# Patient Record
Sex: FEMALE | Race: WHITE | NOT HISPANIC OR LATINO | Employment: UNEMPLOYED | URBAN - METROPOLITAN AREA
[De-identification: names, ages, dates, MRNs, and addresses within clinical notes are randomized per-mention and may not be internally consistent; named-entity substitution may affect disease eponyms.]

---
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
## 2025-01-07 ENCOUNTER — TELEPHONE (OUTPATIENT)
Dept: PSYCHIATRY | Facility: CLINIC | Age: 20
End: 2025-01-07
Payer: COMMERCIAL

## 2025-01-07 DIAGNOSIS — F90.0 ATTENTION DEFICIT HYPERACTIVITY DISORDER (ADHD), PREDOMINANTLY INATTENTIVE TYPE: ICD-10-CM

## 2025-01-07 RX ORDER — ATOMOXETINE 60 MG/1
60 CAPSULE ORAL DAILY
Qty: 90 CAPSULE | Refills: 1 | Status: SHIPPED | OUTPATIENT
Start: 2025-01-07

## 2025-01-07 NOTE — TELEPHONE ENCOUNTER
EDY received faxed 90 day supply request for atomoxetine (STRATTERA) 60 MG capsule          Jazzmine Velez CMA January 7, 2025

## 2025-01-13 ENCOUNTER — TELEPHONE (OUTPATIENT)
Dept: PSYCHIATRY | Facility: CLINIC | Age: 20
End: 2025-01-13
Payer: COMMERCIAL

## 2025-01-13 NOTE — TELEPHONE ENCOUNTER
EDY received faxed 90 day supply request for hydrOXYzine HCl (ATARAX) 10 MG tablet          Jazzmine Velez CMA January 13, 2025

## 2025-01-16 ENCOUNTER — TELEPHONE (OUTPATIENT)
Dept: PSYCHIATRY | Facility: CLINIC | Age: 20
End: 2025-01-16
Payer: COMMERCIAL

## 2025-01-16 NOTE — TELEPHONE ENCOUNTER
EDY received faxed 90 day supply request for hydrOXYzine HCl (ATARAX) 10 MG tablet      Patient does have a follow-up appointment scheduled on 01/23/2025.     Routing to RN to aubriey refmckenzie Velez CMA January 16, 2025

## 2025-01-23 PROBLEM — K35.30 ACUTE APPENDICITIS WITH LOCALIZED PERITONITIS, UNSPECIFIED WHETHER ABSCESS PRESENT, UNSPECIFIED WHETHER GANGRENE PRESENT, UNSPECIFIED WHETHER PERFORATION PRESENT: Status: RESOLVED | Noted: 2023-06-08 | Resolved: 2025-01-23

## 2025-02-06 ENCOUNTER — ANCILLARY PROCEDURE (OUTPATIENT)
Dept: ULTRASOUND IMAGING | Facility: CLINIC | Age: 20
End: 2025-02-06
Attending: FAMILY MEDICINE
Payer: COMMERCIAL

## 2025-02-06 DIAGNOSIS — Z97.5 BREAKTHROUGH BLEEDING WITH IUD: ICD-10-CM

## 2025-02-06 DIAGNOSIS — Z97.5 IUD (INTRAUTERINE DEVICE) IN PLACE: ICD-10-CM

## 2025-02-06 DIAGNOSIS — N92.1 BREAKTHROUGH BLEEDING WITH IUD: ICD-10-CM

## 2025-02-06 PROCEDURE — 76856 US EXAM PELVIC COMPLETE: CPT | Performed by: RADIOLOGY

## 2025-02-06 PROCEDURE — 76830 TRANSVAGINAL US NON-OB: CPT | Performed by: RADIOLOGY

## 2025-02-20 ENCOUNTER — VIRTUAL VISIT (OUTPATIENT)
Dept: PSYCHIATRY | Facility: CLINIC | Age: 20
End: 2025-02-20
Payer: COMMERCIAL

## 2025-02-20 DIAGNOSIS — F90.0 ATTENTION DEFICIT HYPERACTIVITY DISORDER (ADHD), PREDOMINANTLY INATTENTIVE TYPE: ICD-10-CM

## 2025-02-20 DIAGNOSIS — F33.1 MODERATE EPISODE OF RECURRENT MAJOR DEPRESSIVE DISORDER (H): ICD-10-CM

## 2025-02-20 DIAGNOSIS — F41.1 GAD (GENERALIZED ANXIETY DISORDER): ICD-10-CM

## 2025-02-20 RX ORDER — VILAZODONE HYDROCHLORIDE 40 MG/1
40 TABLET ORAL
Qty: 30 TABLET | Refills: 1 | Status: SHIPPED | OUTPATIENT
Start: 2025-02-20

## 2025-02-20 ASSESSMENT — PAIN SCALES - GENERAL: PAINLEVEL_OUTOF10: NO PAIN (0)

## 2025-02-20 NOTE — PATIENT INSTRUCTIONS
Medication Plan  -Continue atomoxetine 60mg daily for ADHD  -Increase Viibryd to 40mg daily   -Continue hydroxyzine 10-20mg once daily as needed for anxiety or sleep    **For crisis resources, please see the information at the end of this document**   Patient Education    Thank you for coming to the [facility] [place] [facility].     Lab Testing:  If you had lab testing today and your results are reassuring or normal they will be mailed to you or sent through [employer] within 7 days. If the lab tests need quick action we will call you with the results. The phone number we will call with results is # [phone]. If this is not the best number please call our clinic and change the number.     Medication Refills:  If you need any refills please call your pharmacy and they will contact us. Our fax number for refills is [phone].   Three business days of notice are needed for general medication refill requests.   Five business days of notice are needed for controlled substance refill requests.   If you need to change to a different pharmacy, please contact the new pharmacy directly. The new pharmacy will help you get your medications transferred.     Contact Us:  Please call [phone] during business hours (8-5:00 M-F).   If you have medication related questions after clinic hours, or on the weekend, please call [phone].     Financial Assistance [phone]   Medical Records [phone]       MENTAL HEALTH CRISIS RESOURCES:  For a emergency help, please call 911 or go to the nearest Emergency Department.     Emergency Walk-In Options:   EmPATH Unit @ [facility] ([place]): [phone] - Specialized mental health emergency area designed to be calming  [facility] ([place]): [phone]  [facility] Psychiat[facility] ([place]): [phone]  [facility] ([place]): [phone]    [facility] Crisis Information:   [place]:  [phone]  [place]: [phone]  [place] (COPE) - Adult: [phone]     Child: [phone]  [place] - Adult: [phone]     Child: [phone]  [place]: [phone]  List of all [facility] resources:   https://[place].gov/dhs/people-we-serve/adults/health-care/mental-health/resources/crisis-contacts.jsp    National Crisis Information:   Crisis Text Line: Text  [place]  to 416881  Suicide & Crisis Lifeline: 988  National Suicide Prevention Lifeline: 5-280-934-TALK (1-824.342.6466)       For online chat options, visit https://suicidepreventionlifeline.org/chat/  Poison Control Center: 9-229-103-4725  Trans Lifeline: 1-955.444.1562 - Hotline for transgender people of all ages  [employer]: 8-826-340-7369 - Hotline for LGBT youth     For Non-Emergency Support:   Fast Tracker: Mental Health & Substance Use Disorder Resources -   https://www.[employer].org/

## 2025-02-20 NOTE — PROGRESS NOTES
[facility]  MEDICAL PROGRESS NOTE     Jenae Callaway is a 20 year old adult who prefers the name Lena and pronouns she/her.     CARE TEAM:   PCP- Jeanie West  Therapist- Lacey Gutierrez at [facility]              Assessment & Plan   History and interview support the following diagnoses:   Generalized anxiety disorder  Major depressive disorder, recurrent, moderate  ADHD, predominantly inattentive  Trichotillomania      Lena is a 20-year-old adult with psychiatric history of anxiety, depression, ADHD, and trichotillomania who presents for psychiatric follow-up.      Lena was last seen 12/23/24 at which time no changes were made. Today, anxiety has been stable however Lena has been experiencing  low energy, low motivation, and anhedonia. She attributes this in part to the season/weather. To target depressive symptoms will increase Viibryd to 40mg daily and adjust as needed over the next several months with upcoming seasonal change. We did discuss risks/benefits/side effects of dose increase, including a review of serotonin excess and symptoms to monitor for. She knows to contact clinic between visits should any concerns arise. Denies all safety concerns.     Evaluation from 12/28/23 initial assessment:  Lena Callaway is a very pleasant 19yo adult who presents for a psychiatric evaluation at the [facility]. Patient carries previous diagnoses of MDD, NANNETTE, ADHD, and trichotillomania. She previously received care at [facility] and was most recently seen by Dr. Tisha Grant on 05/23/23 at which time sertraline 200mg daily and Concerta 36mg daily were continued. She transitioned out of [facility] due to turning 18 earlier this year. Since that visit Lena has struggled to establish with an adult psychiatric provider. 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 She currently resides in [place] and is only home in [place] for a short period of time over the holidays. She plans to return to [place] in early January 2023 and will move back to the [place] in June 2024. Given that I am unable to prescribe medications or treat patient while she is overseas we decided to complete the evaluation today but will defer medications until patient returns from [place]. I recommended patient attempt to establish care with a psychiatric provider in [place] in the meantime. Both patient and her mother are in agreement with this plan and will contact clinic in June to schedule a follow-up when patient returns home. There are no acute safety concerns. No problematic substance use. Patient is not currently prescribed psychotropic medications.     Medication discussion:  Since stopping sertraline and Concerta patient has noticed minimal impact on mood or anxiety. 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 She has had several previous stimulant trials noting that ritalin IR seemed to be the most effective and best tolerated. Concerta and ritalin LA led to tachycardia and hyperfocus. We had a thorough discussion of non-stimulant options for management of ADHD - including bupropion, atomoxetine, or clonidine/guanfacine. The patient expressed interest in future trial of a non-stimulant medication for management of ADHD. Strattera may be a good option as it has been shown to be beneficial for ADHD as well as anxiety. We also reviewed benefits of antidepressant medication for management of mood/anxiety. She has past trials of escitalopram and sertraline. She felt that escitalopram led to more mood lability but sertraline seemed to be beneficial and well-tolerated. Could consider use of sertraline again in the future or fluoxetine which could be a good option for mood/anxiety and  trichotillomania. We will plan to continue our medication discussion on follow-up in June.     PSYCHOTROPIC DRUG INTERACTIONS: **Italicized interactions are for treatment plan options not currently implemented**  N/A    MANAGEMENT:  N/A    [place]: Not taking controlled medications.              Plan    1) PSYCHOTROPIC MEDICATION RECOMMENDATIONS:  -Continue atomoxetine 60mg daily for ADHD  -Increase Viibryd to 40mg daily   -Continue hydroxyzine 10-20mg once daily as needed for anxiety or sleep    Taking OTC supplement Berberine. DDI completed - nothing clinically significant noted with Strattera + Viibryd.    2) THERAPY: None    3) NEXT DUE:   Labs- None - LFTs completed 07/10/24 prior to initiation of atomoxetine (WNL)  ECG- N/A   Rating Scales- N/A    4) REFERRALS / COORDINATION: PGx testing    5) RTC: 04/10 at 4p    Treatment Risk Statement:  The patient understands the risks, benefits, adverse effects and alternatives. Agrees to treatment with the capacity to do so. No medical contraindications to treatment. Agrees to contact provider for any problems. The patient understands to call 911 or go to the nearest ED if urgent or life threatening symptoms occur. Crisis contact numbers are provided routinely in the After Visit Summary.     Lena did not appear to be an imminent safety risk to self or others.    PROVIDER:  YANICK Arora CNP              Pertinent Background  Jenae notes history of anxiety, depression, ADHD, and trichotillomania. She was followed previously by a child fellow (Dr. Grant) at [facility] however transitioned out of that clinic in May 2023 due to turning 18-years-old. Since then Lena has struggled to establish consistent care with an adult psychiatric provider. She had one previous appointment with a provider through [employer] who has since left the organization. Stopped sertraline and Concerta summer 2023. Not currently taking psychotropic medication.   No history  "of self-harm, suicide attempts, hypo/leonard, psychosis, or psychiatric hospitalizations. Hx of passive SI lauro and senior year of high school; never with plan or intent. She completed PHP in spring 2022.   Psych pertinent item history includes suicidal ideation              Interim History     Lena was last seen 12/23/24 at which time no changes were made. Since the last visit:  -This semester has been going well. Coursework is harder this semester but finding content more interesting.   -Anxiety has been stable. Mood is \"fine\" but feeling very unmotivated, slow, difficulty getting out of bed, anhedonia.   -No safety concerns - SI/NSSI/HI.     Medication ASE: Denies  Medication adherence: Reports good adherence    Recent Psych Symptoms:   Depression: Improved mood, self-esteem  Elevated:  none  Psychosis:  none  Anxiety:  excessive worry; worry usually pertains to the future and feeling like she isn't doing enough.  Trauma Related:  none  Insomnia: Does have trouble getting herself to actually getting to bed. Some nights can take ~20 min to fall asleep.  Other:  Yes: hx of trichotillomania, improving. Continues to engage in daily hair pulling from scalp or eye-brows usually at the end of the day when their are less distractions. Describes this as more like \"playing\" with her hair. Bald spots have grown in. Symptoms less severe than in the past.     Pertinent Negatives: No suicidal or violent ideation, psychosis, or hypo/leonard.      Pertinent Social Hx:  FINANCIAL SUPPORT-  Graduated college in spring 2023. 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 Taking courses at [employer].  LIVING SITUATION / RELATIONSHIPS- Living with parents, younger brother.  SOCIAL/ SPIRITUAL SUPPORT- Family, friends    Pertinent Substance Use  Alcohol- None  Nicotine- None  Caffeine- None   Opioids- None  Narcan Kit- N/A  THC/CBD- None  Other Illicit Drugs- none              Medical Review " of Systems   Dizziness/orthostasis- Denies  Headaches- Denies  GI- Denies  Denies history of renal, hepatic, or cardiac concerns. Was seen by cardiology in July 2023 due to history of dyslipidemia. Working on lifestyle strategies for lowering cholesterol.   A comprehensive review of systems was performed and is negative other than noted above.                                                                                                                                       Psych Summary Points  12/2023: Initial consultation; no medication changes as patient will be returning to live in [place] through June 2024.  07/2024: Started Strattera 40mg daily for ADHD symptoms, added hydroxyzine 10-20mg every day PRN for breakthrough anxiety symptoms. LFTs completed 07/10/24 prior to initiation of Strattera - LFTs = WNL.  08/2024: Started Zoloft 50mg daily    10/2024: Self-discontinued Zoloft due to ASE, Viibryd 10mg daily started for depressive symptoms.  11/2024: Increased Strattera to 60mg daily for ADHD management. Increased Viibryd to 20mg daily.  02/2025: Increased Viibryd 40mg daily for mood support               Past Psychotropic Medications     Medication Max Dose (mg) Dates / Duration Helpful? DC Reason / Adverse Effects?   Ritalin IR 5mg BID PRN   Worked the best out of all stimulants. Helped with focus, thinking, organization of thoughts. Pretty well tolerated. Took with Ritalin LA 20mg every day.   sertraline 200mg Spring 2022-Fall 2023  Helpful for mood, but no noticeable difference when stopped. Denies ASE from med. Re-trial fall 2024 - led to insomnia, heightened anxiety and was subsequently discontinued.    Ritalin LA 20mg   Tachycardia    Concerta 36mg 2023  Physically felt anxious, tachycardia, hyperfocus   Lexapro 20mg [id number]  Mood lability, took prior to sertraline   Propranolol 10mg every day PRN 2022                   Past Medical History     Medication allergies:    Allergies   Allergen Reactions     Animal Dander Dermatitis and Itching       Neurologic Hx [head injury, seizures, etc.]: None  Patient Active Problem List   Diagnosis    Generalized anxiety disorder    Trichotillomania    NANNETTE (generalized anxiety disorder)    Menorrhagia with regular cycle    Attention deficit hyperactivity disorder (ADHD), predominantly inattentive type    Mixed hyperlipidemia    IUD (intrauterine device) in place     Past Medical History:   Diagnosis Date    Acute appendicitis with localized peritonitis, unspecified whether abscess present, unspecified whether gangrene present, unspecified whether perforation present 06/08/2023       Contraception- IUD in place, not sexually active               Medications   Current Outpatient Medications   Medication Sig Dispense Refill    atomoxetine (STRATTERA) 60 MG capsule Take 1 capsule (60 mg) by mouth daily. 90 capsule 1    hydrOXYzine HCl (ATARAX) 10 MG tablet Take 1-2 tablets (10-20 mg) by mouth daily as needed for anxiety. 30 tablet 0    levonorgestrel (MIRENA) 52 MG (20 mcg/day) IUD by Intrauterine route once      vilazodone (VIIBRYD) 40 MG TABS tablet Take 1 tablet (40 mg) by mouth daily with food. 30 tablet 1                 Physical Exam  (Vitals Only)  LMP 12/29/2024 (Approximate)     Pulse Readings from Last 5 Encounters:   01/23/25 90   11/25/24 77   08/19/24 105   07/09/24 92   07/08/24 93     Wt Readings from Last 5 Encounters:   01/23/25 83.9 kg (185 lb)   11/25/24 81.6 kg (180 lb) (94%, Z= 1.59)*   08/19/24 81.9 kg (180 lb 9.6 oz) (95%, Z= 1.61)*   07/09/24 79.8 kg (176 lb) (94%, Z= 1.53)*   07/08/24 80.4 kg (177 lb 3.2 oz) (94%, Z= 1.55)*     * Growth percentiles are based on CDC (Girls, 2-20 Years) data.     BP Readings from Last 5 Encounters:   01/23/25 104/74   11/25/24 110/62   08/19/24 113/80   07/09/24 106/74   07/08/24 105/75                 Mental Status Exam  Alertness: alert  and oriented  Appearance: adequately groomed  Behavior/Demeanor: cooperative,  pleasant, and calm, with good eye contact   Speech: normal and regular rate and rhythm  Language: intact and no problems  Psychomotor: fidgety  Mood: depressed  Affect: full range and appropriate; was congruent to mood  Thought Process/Associations: unremarkable  Thought Content:  Reports none;  Denies suicidal ideation, violent ideation, and delusions  Perception:  Reports none;  Denies auditory hallucinations and visual hallucinations  Insight: intact  Judgment: intact  Cognition: does  appear grossly intact; formal cognitive testing was not done  oriented: time, person, and place  Gait and Station:  [facility]                Data         8/19/2024    12:18 PM 10/10/2024     1:38 PM 1/23/2025     3:58 PM   PHQ   PHQ-9 Total Score 6 4 7    Q9: Thoughts of better off dead/self-harm past 2 weeks Not at all Not at all Not at all       Patient-reported         10/10/2024     1:39 PM 12/23/2024    12:05 PM 1/23/2025     3:59 PM   NANNETTE-7 SCORE   Total Score 3 (minimal anxiety) 10 (moderate anxiety) 5 (mild anxiety)   Total Score 3 10  5        Patient-reported       Liver/kidney function Metabolic Blood counts   Recent Labs   Lab Test 07/10/24  1043 06/07/23  2240   CR 0.67 0.69   AST 23 22   ALT 20 14   ALKPHOS 102 102*    Recent Labs   Lab Test 01/31/25  0913 07/10/24  1043 01/09/24  1124   CHOL 222*   < > 298*   TRIG 162*   < > 119*   *   < > 221*   HDL 42*   < > 53   TSH  --   --  3.23    < > = values in this interval not displayed.    Recent Labs   Lab Test 07/10/24  1043   WBC 7.6   HGB 13.1   HCT 39.1   MCV 91             Administrative Billing:     Level of Medical Decision Making:   - At least 1 chronic problem that is not stable  - Engaged in prescription drug management during visit (discussed any medication benefits, side effects, alternatives, etc.)    The longitudinal plan of care for the diagnosis(es)/condition(s) of ADHD, major depressive disorder, and generalized anxiety disorder as  documented were addressed during this visit. Due to the added complexity in care, I will continue to support Lena in the subsequent management and with ongoing continuity of care.

## 2025-02-20 NOTE — NURSING NOTE
Current patient location: [address] [place] [address]    Is the patient currently in the state of [place]? YES    Visit mode: VIDEO    If the visit is dropped, the patient can be reconnected by:VIDEO VISIT: Text to cell phone:   Telephone Information:   Mobile [phone]   Mobile [phone]   Mobile [phone]       Will anyone else be joining the visit? NO  (If patient encounters technical issues they should call [phone]    Are changes needed to the allergy or medication list? No    Are refills needed on medications prescribed by this physician? NO    Rooming Documentation:  Patient will complete questionnaire(s) in [facility]    Reason for visit: RECHECK    Keke COONF

## 2025-03-20 ENCOUNTER — OFFICE VISIT (OUTPATIENT)
Dept: URGENT CARE | Facility: URGENT CARE | Age: 20
End: 2025-03-20
Payer: COMMERCIAL

## 2025-03-20 VITALS
WEIGHT: 180 LBS | SYSTOLIC BLOOD PRESSURE: 110 MMHG | BODY MASS INDEX: 28.93 KG/M2 | TEMPERATURE: 97.3 F | HEIGHT: 66 IN | DIASTOLIC BLOOD PRESSURE: 68 MMHG | RESPIRATION RATE: 18 BRPM | OXYGEN SATURATION: 97 % | HEART RATE: 78 BPM

## 2025-03-20 DIAGNOSIS — J06.9 VIRAL URI: Primary | ICD-10-CM

## 2025-03-20 NOTE — PROGRESS NOTES
"Chief Complaint   Patient presents with    Urgent Care     Not feeling well, x 7 days. Coughing, mucus, congestion, sinus congestion, trouble hearing.        Assessment & Plan  Assessment  -Viral URI.  Reassuring exam and vitals.    Plan  - Use Mucinex or Mucinex DM for symptom relief.  - Use a teaspoon of honey to help with the cough.  - Use ibuprofen or Tylenol for additional symptom relief.  - Rest and avoid going out or partying over the weekend.  - Follow-up if symptoms persist after a week for further evaluation.     ICD-10-CM    1. Viral URI  J06.9           SUBJECTIVE:  History of Present Illness-Lena Callaway, a 20-year-old female, reports experiencing symptoms consistent with a cold for the past week. She notes that her condition has worsened since Sunday. Her symptoms include a cough, which is sometimes dry and sometimes productive with mucus, nasal congestion, muffled hearing, and sinus pressure that she describes as painful. She denies having a fever. A few days ago, she felt as though her head was completely swollen, but this sensation has slightly improved.     ROS: Pertinent ROS neg other than the symptoms noted above in the HPI.     OBJECTIVE:  /68   Pulse 78   Temp 97.3  F (36.3  C) (Temporal)   Resp 18   Ht 1.676 m (5' 6\")   Wt 81.6 kg (180 lb)   LMP 12/29/2024 (Approximate)   SpO2 97%   BMI 29.05 kg/m     Physical Exam  - HEENT: Congested nares noted.  TMs within normal limits, no oropharynx erythema  - LUNGS: Breath sounds clear bilaterally.   GENERAL: alert and no distress  CV: regular rate and rhythm, normal S1 S2, no S3 or S4, no murmur, click or rub, no peripheral edema     DIAGNOSTICS       No results found for any visits on 03/20/25.     Grant Van PA-C    Consent was obtained from the patient to use an AI documentation tool in the creation of this note.  Any grammatical or spelling errors are non-intentional. Please contact the author of this note directly if you are " in need of any clarification.     Current Outpatient Medications   Medication Sig Dispense Refill    atomoxetine (STRATTERA) 60 MG capsule Take 1 capsule (60 mg) by mouth daily. 90 capsule 1    hydrOXYzine HCl (ATARAX) 10 MG tablet Take 1-2 tablets (10-20 mg) by mouth daily as needed for anxiety. 30 tablet 0    levonorgestrel (MIRENA) 52 MG (20 mcg/day) IUD by Intrauterine route once      vilazodone (VIIBRYD) 40 MG TABS tablet Take 1 tablet (40 mg) by mouth daily with food. 30 tablet 1     No current facility-administered medications for this visit.      Patient Active Problem List   Diagnosis    Generalized anxiety disorder    Trichotillomania    NANNETTE (generalized anxiety disorder)    Menorrhagia with regular cycle    Attention deficit hyperactivity disorder (ADHD), predominantly inattentive type    Mixed hyperlipidemia    IUD (intrauterine device) in place      Past Medical History:   Diagnosis Date    Acute appendicitis with localized peritonitis, unspecified whether abscess present, unspecified whether gangrene present, unspecified whether perforation present 06/08/2023     Past Surgical History:   Procedure Laterality Date    LAPAROSCOPIC APPENDECTOMY N/A 6/8/2023    Procedure: Laparoscopic appendectomy;  Surgeon: Jarod South MD;  Location:  OR     Family History   Problem Relation Age of Onset    Substance Abuse Maternal Grandfather     Substance Abuse Maternal Aunt     Glaucoma No family hx of     Hypertension No family hx of     Diabetes No family hx of     Macular Degeneration No family hx of     Breast Cancer No family hx of     Colon Cancer No family hx of     Ovarian Cancer No family hx of      Social History     Tobacco Use    Smoking status: Never     Passive exposure: Never    Smokeless tobacco: Never   Substance Use Topics    Alcohol use: Not Currently

## 2025-04-10 ENCOUNTER — VIRTUAL VISIT (OUTPATIENT)
Dept: PSYCHIATRY | Facility: CLINIC | Age: 20
End: 2025-04-10
Payer: COMMERCIAL

## 2025-04-10 DIAGNOSIS — F33.1 MODERATE EPISODE OF RECURRENT MAJOR DEPRESSIVE DISORDER (H): ICD-10-CM

## 2025-04-10 DIAGNOSIS — F41.1 GAD (GENERALIZED ANXIETY DISORDER): ICD-10-CM

## 2025-04-10 DIAGNOSIS — F90.0 ATTENTION DEFICIT HYPERACTIVITY DISORDER (ADHD), PREDOMINANTLY INATTENTIVE TYPE: ICD-10-CM

## 2025-04-10 RX ORDER — VILAZODONE HYDROCHLORIDE 40 MG/1
40 TABLET ORAL
Qty: 90 TABLET | Refills: 0 | Status: SHIPPED | OUTPATIENT
Start: 2025-04-10

## 2025-04-10 ASSESSMENT — PAIN SCALES - GENERAL: PAINLEVEL_OUTOF10: NO PAIN (0)

## 2025-04-10 NOTE — PROGRESS NOTES
[facility]  MEDICAL PROGRESS NOTE     Jenae Callaway is a 20 year old adult who prefers the name Lena and pronouns she/her.     CARE TEAM:   PCP- Jeanie West  Therapist- Lacey Gutierrez at [facility]              Assessment & Plan   History and interview support the following diagnoses:   Generalized anxiety disorder  Major depressive disorder, recurrent, moderate  ADHD, predominantly inattentive  Trichotillomania      Lena is a 20-year-old adult with psychiatric history of anxiety, depression, ADHD, and trichotillomania who presents for psychiatric follow-up.      Lena was last seen 02/20/25 at which time Viibryd was increased to 40mg daily. Today, Lena reports improvement in motivation and more stable mood since increasing Viibryd. She continues to do well this semester with school and has maintained a part-time job. She also feels that the warmer weather is contributing to improvements. She denies ASE from medications and we will make no changes today.  No safety concerns.     Evaluation from 12/28/23 initial assessment:  Lena Callaway is a very pleasant 19yo adult who presents for a psychiatric evaluation at the [facility]. Patient carries previous diagnoses of MDD, NANNETTE, ADHD, and trichotillomania. She previously received care at [facility] and was most recently seen by Dr. Tisha Grant on 05/23/23 at which time sertraline 200mg daily and Concerta 36mg daily were continued. She transitioned out of [facility] due to turning 18 earlier this year. Since that visit Lena has struggled to establish with an adult psychiatric provider. 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 She currently resides in [place] and is only home in [place] for a short period of time over the holidays. She plans to return to [place] in early January 2023 and will  move back to the [place] in June 2024. Given that I am unable to prescribe medications or treat patient while she is overseas we decided to complete the evaluation today but will defer medications until patient returns from [place]. I recommended patient attempt to establish care with a psychiatric provider in [place] in the meantime. Both patient and her mother are in agreement with this plan and will contact clinic in June to schedule a follow-up when patient returns home. There are no acute safety concerns. No problematic substance use. Patient is not currently prescribed psychotropic medications.     Medication discussion:  Since stopping sertraline and Concerta patient has noticed minimal impact on mood or anxiety. 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 She has had several previous stimulant trials noting that ritalin IR seemed to be the most effective and best tolerated. Concerta and ritalin LA led to tachycardia and hyperfocus. We had a thorough discussion of non-stimulant options for management of ADHD - including bupropion, atomoxetine, or clonidine/guanfacine. The patient expressed interest in future trial of a non-stimulant medication for management of ADHD. Strattera may be a good option as it has been shown to be beneficial for ADHD as well as anxiety. We also reviewed benefits of antidepressant medication for management of mood/anxiety. She has past trials of escitalopram and sertraline. She felt that escitalopram led to more mood lability but sertraline seemed to be beneficial and well-tolerated. Could consider use of sertraline again in the future or fluoxetine which could be a good option for mood/anxiety and trichotillomania. We will plan to continue our medication discussion on follow-up in June.     PSYCHOTROPIC DRUG INTERACTIONS: **Italicized interactions are for treatment plan options not  currently implemented**  N/A    MANAGEMENT:  N/A    [place] was not [place] today: Not taking controlled medications.              Plan    1) PSYCHOTROPIC MEDICATION RECOMMENDATIONS:  -Continue atomoxetine 60mg daily for ADHD  -Continue Viibryd to 40mg daily   -Continue hydroxyzine 10-20mg once daily as needed for anxiety or sleep    Taking OTC supplement Berberine. DDI completed - nothing clinically significant noted with Strattera + Viibryd.    2) THERAPY: None    3) NEXT DUE:   Labs- None - LFTs completed 07/10/24 prior to initiation of atomoxetine (WNL)  ECG- N/A   Rating Scales- N/A    4) REFERRALS / COORDINATION: Consider PGx testing    5) RTC: May 22nd at 1pm    Treatment Risk Statement:  The patient understands the risks, benefits, adverse effects and alternatives. Agrees to treatment with the capacity to do so. No medical contraindications to treatment. Agrees to contact provider for any problems. The patient understands to call 911 or go to the nearest ED if urgent or life threatening symptoms occur. Crisis contact numbers are provided routinely in the After Visit Summary.     Lena did not appear to be an imminent safety risk to self or others.    PROVIDER:  YANICK Arora CNP              Pertinent Background  Jenae notes history of anxiety, depression, ADHD, and trichotillomania. She was followed previously by a child fellow (Dr. Grant) at [facility] however transitioned out of that clinic in May 2023 due to turning 18-years-old. Since then Lena has struggled to establish consistent care with an adult psychiatric provider. She had one previous appointment with a provider through [facility] who has since left the organization. Stopped sertraline and Concerta summer 2023. Not currently taking psychotropic medication.   No history of self-harm, suicide attempts, hypo/leonard, psychosis, or psychiatric hospitalizations. Hx of passive SI lauro and senior year of high school; never with plan or intent. She  "completed PHP in spring 2022.   Psych pertinent item history includes suicidal ideation              Interim History     Lena was last seen 02/20/25 at which time Viibryd was increased to 40mg daily. Since the last visit:  -Things have been \"pretty good.\" Spending more time outside as the weather has been warming up.  -This semester has been going well- As and Bs on her classes. Wants to go into medicine.   -Things at home are going okay; occasional arguments with her parents but no recent \"  -Continues to struggle somewhat with getting herself to sleep at a consistent time. Sleep schedule can impact punctuality.   -She reports good benefit from the higher dose of Viibryd. Denies ASE.     Medication ASE: Denies  Medication adherence: Reports good adherence    Recent Psych Symptoms:   Depression: Motivation and mood are more consistent.   Elevated:  none  Psychosis:  none  Anxiety:  excessive worry; worry usually pertains to the future and feeling like she isn't doing enough.  Trauma Related:  none  Insomnia: Does have trouble getting herself to actually getting to bed. Some nights can take ~20 min to fall asleep.  Other:  Yes: hx of trichotillomania, improving. Occasionally will hair pull when she is stressed or tired.     Pertinent Negatives: No suicidal or violent ideation, psychosis, or hypo/leonard.      Pertinent Social Hx:  FINANCIAL SUPPORT-  Working as a ; about 20 hours per week. Taking courses at [employer].  LIVING SITUATION / RELATIONSHIPS- Living with parents, younger brother.  SOCIAL/ SPIRITUAL SUPPORT- Family, friends    Pertinent Substance Use  Alcohol- None  Nicotine- None  Caffeine- None   Opioids- None  Narcan Kit- N/A  THC/CBD- None  Other Illicit Drugs- none              Medical Review of Systems   Dizziness/orthostasis- Denies  Headaches- Denies  GI- Denies  Denies history of renal, hepatic, or cardiac concerns. Was seen by cardiology in July 2023 due to history of " dyslipidemia. Working on lifestyle strategies for lowering cholesterol.   A comprehensive review of systems was performed and is negative other than noted above.                                                                                                                                       Psych Summary Points  12/2023: Initial consultation; no medication changes as patient will be returning to live in [place] through June 2024.  07/2024: Started Strattera 40mg daily for ADHD symptoms, added hydroxyzine 10-20mg every day PRN for breakthrough anxiety symptoms. LFTs completed 07/10/24 prior to initiation of Strattera - LFTs = WNL.  08/2024: Started Zoloft 50mg daily    10/2024: Self-discontinued Zoloft due to ASE, Viibryd 10mg daily started for depressive symptoms.  11/2024: Increased Strattera to 60mg daily for ADHD management. Increased Viibryd to 20mg daily.  02/2025: Increased Viibryd 40mg daily for mood support               Past Psychotropic Medications     Medication Max Dose (mg) Dates / Duration Helpful? DC Reason / Adverse Effects?   Ritalin IR 5mg BID PRN   Worked the best out of all stimulants. Helped with focus, thinking, organization of thoughts. Pretty well tolerated. Took with Ritalin LA 20mg every day.   sertraline 200mg Spring 2022-Fall 2023  Helpful for mood, but no noticeable difference when stopped. Denies ASE from med. Re-trial fall 2024 - led to insomnia, heightened anxiety and was subsequently discontinued.    Ritalin LA 20mg   Tachycardia    Concerta 36mg 2023  Physically felt anxious, tachycardia, hyperfocus   Lexapro 20mg [id number]  Mood lability, took prior to sertraline   Propranolol 10mg every day PRN 2022                   Past Medical History     Medication allergies:    Allergies   Allergen Reactions    Animal Dander Dermatitis and Itching       Neurologic Hx [head injury, seizures, etc.]: None  Patient Active Problem List   Diagnosis    Generalized anxiety disorder     Trichotillomania    NANNETTE (generalized anxiety disorder)    Menorrhagia with regular cycle    Attention deficit hyperactivity disorder (ADHD), predominantly inattentive type    Mixed hyperlipidemia    IUD (intrauterine device) in place     Past Medical History:   Diagnosis Date    Acute appendicitis with localized peritonitis, unspecified whether abscess present, unspecified whether gangrene present, unspecified whether perforation present 06/08/2023       Contraception- IUD in place, not sexually active               Medications   Current Outpatient Medications   Medication Sig Dispense Refill    atomoxetine (STRATTERA) 60 MG capsule Take 1 capsule (60 mg) by mouth daily. 90 capsule 1    hydrOXYzine HCl (ATARAX) 10 MG tablet Take 1-2 tablets (10-20 mg) by mouth daily as needed for anxiety. 30 tablet 0    levonorgestrel (MIRENA) 52 MG (20 mcg/day) IUD by Intrauterine route once      vilazodone (VIIBRYD) 40 MG TABS tablet Take 1 tablet (40 mg) by mouth daily with food. 30 tablet 1                 Physical Exam  (Vitals Only)  There were no vitals taken for this visit.    Pulse Readings from Last 5 Encounters:   03/20/25 78   01/23/25 90   11/25/24 77   08/19/24 105   07/09/24 92     Wt Readings from Last 5 Encounters:   03/20/25 81.6 kg (180 lb)   01/23/25 83.9 kg (185 lb)   11/25/24 81.6 kg (180 lb) (94%, Z= 1.59)*   08/19/24 81.9 kg (180 lb 9.6 oz) (95%, Z= 1.61)*   07/09/24 79.8 kg (176 lb) (94%, Z= 1.53)*     * Growth percentiles are based on CDC (Girls, 2-20 Years) data.     BP Readings from Last 5 Encounters:   03/20/25 110/68   01/23/25 104/74   11/25/24 110/62   08/19/24 113/80   07/09/24 106/74                 Mental Status Exam  Alertness: alert  and oriented  Appearance: adequately groomed  Behavior/Demeanor: cooperative, pleasant, and calm, with good eye contact   Speech: normal and regular rate and rhythm  Language: intact and no problems  Psychomotor: fidgety  Mood: depressed  Affect: full range and  appropriate; was congruent to mood  Thought Process/Associations: unremarkable  Thought Content:  Reports none;  Denies suicidal ideation, violent ideation, and delusions  Perception:  Reports none;  Denies auditory hallucinations and visual hallucinations  Insight: intact  Judgment: intact  Cognition: does  appear grossly intact; formal cognitive testing was not done  oriented: time, person, and place  Gait and Station:  N/A (telehealth)                Data         8/19/2024    12:18 PM 10/10/2024     1:38 PM 1/23/2025     3:58 PM   PHQ   PHQ-9 Total Score 6 4 7    Q9: Thoughts of better off dead/self-harm past 2 weeks Not at all Not at all Not at all       Patient-reported         10/10/2024     1:39 PM 12/23/2024    12:05 PM 1/23/2025     3:59 PM   NANNETTE-7 SCORE   Total Score 3 (minimal anxiety) 10 (moderate anxiety) 5 (mild anxiety)   Total Score 3 10  5        Patient-reported       Liver/kidney function Metabolic Blood counts   Recent Labs   Lab Test 07/10/24  1043 06/07/23  2240   CR 0.67 0.69   AST 23 22   ALT 20 14   ALKPHOS 102 102*    Recent Labs   Lab Test 01/31/25  0913 07/10/24  1043 01/09/24  1124   CHOL 222*   < > 298*   TRIG 162*   < > 119*   *   < > 221*   HDL 42*   < > 53   TSH  --   --  3.23    < > = values in this interval not displayed.    Recent Labs   Lab Test 07/10/24  1043   WBC 7.6   HGB 13.1   HCT 39.1   MCV 91             Administrative Billing:     Level of Medical Decision Making:   - At least 1 chronic problem that is not stable  - Engaged in prescription drug management during visit (discussed any medication benefits, side effects, alternatives, etc.)    The longitudinal plan of care for the diagnosis(es)/condition(s) as documented were addressed during this visit. Due to the added complexity in care, I will continue to support Lena in the subsequent management and with ongoing continuity of care.

## 2025-04-10 NOTE — PROGRESS NOTES
Virtual Visit Details    Type of service:  Video Visit     Originating Location (pt. Location): Parked car    Distant Location (provider location):  Off-site  Platform used for Video Visit: Kristina

## 2025-04-10 NOTE — NURSING NOTE
Is the patient currently in the state of [place]? YES    Current patient location:  Parked in car outside of work in [place].    Visit mode:Video    If the visit is dropped, the patient can be reconnected by: VIDEO VISIT: Text to cell phone:   Telephone Information:   Mobile [phone]       Will anyone else be joining the visit? No  (If patient encounters technical issues they should call [phone])    Are changes needed to the allergy or medication list? Pt stated no changes to allergies and Pt stated no med changes    Are refills needed on medications prescribed by this physician? Yes    Rooming Documentation: Questionnaire(s) completed.    Reason for visit: RECHECK     Dottie Cornelius, JAYMIEF

## 2025-04-10 NOTE — PATIENT INSTRUCTIONS
Plan:  -Continue atomoxetine 60mg daily for ADHD  -Continue Viibryd to 40mg daily   -Continue hydroxyzine 10-20mg once daily as needed for anxiety or sleep    **For crisis resources, please see the information at the end of this document**   Patient Education    Thank you for coming to the [facility] [place] [facility].     Lab Testing:  If you had lab testing today and your results are reassuring or normal they will be mailed to you or sent through Basha within 7 days. If the lab tests need quick action we will call you with the results. The phone number we will call with results is # [phone]. If this is not the best number please call our clinic and change the number.     Medication Refills:  If you need any refills please call your pharmacy and they will contact us. Our fax number for refills is [phone].   Three business days of notice are needed for general medication refill requests.   Five business days of notice are needed for controlled substance refill requests.   If you need to change to a different pharmacy, please contact the new pharmacy directly. The new pharmacy will help you get your medications transferred.     Contact Us:  Please call [phone] during business hours (8-5:00 M-F).   If you have medication related questions after clinic hours, or on the weekend, please call [phone].     Financial Assistance [phone]   Medical Records [phone]       MENTAL HEALTH CRISIS RESOURCES:  For a emergency help, please call 911 or go to the nearest Emergency Department.     Emergency Walk-In Options:   EmPATH Unit @ [facility] ([place]): [phone] - Specialized mental health emergency area designed to be calming  [facility] ([place]): [phone]  [facility] ([place]): [phone]  [facility] ([place]): [phone]    [facility] Crisis Information:   [place]: [phone]  [place]:  [phone]  [place] ([place]) - Adult: [phone]     Child: [phone]  [place] - Adult: [phone]     Child: [phone]  [place]: [phone]  List of all [facility] resources:   https://[place].gov/dhs/people-we-serve/adults/health-care/mental-health/resources/crisis-contacts.jsp    National Crisis Information:   Crisis Text Line: Text  [place]  to 981865  Suicide & Crisis Lifeline: 988  National Suicide Prevention Lifeline: 4-412-359-TALK (1-739.407.6600)       For online chat options, visit https://suicidepreventionlifeline.org/chat/  Poison Control Center: 1-188.525.6329  Trans Lifeline: 1-592.625.2160 - Hotline for transgender people of all ages  [employer]: 8-217-198-8953 - Hotline for LGBT youth     For Non-Emergency Support:   Fast Tracker: Mental Health & Substance Use Disorder Resources -   https://www.[employer].org/

## 2025-05-07 ENCOUNTER — OFFICE VISIT (OUTPATIENT)
Dept: URGENT CARE | Facility: URGENT CARE | Age: 20
End: 2025-05-07
Payer: COMMERCIAL

## 2025-05-07 VITALS
SYSTOLIC BLOOD PRESSURE: 118 MMHG | RESPIRATION RATE: 16 BRPM | DIASTOLIC BLOOD PRESSURE: 80 MMHG | WEIGHT: 180 LBS | HEIGHT: 66 IN | HEART RATE: 108 BPM | BODY MASS INDEX: 28.93 KG/M2 | TEMPERATURE: 97.4 F | OXYGEN SATURATION: 98 %

## 2025-05-07 DIAGNOSIS — R07.0 THROAT PAIN: Primary | ICD-10-CM

## 2025-05-07 DIAGNOSIS — R53.83 OTHER FATIGUE: ICD-10-CM

## 2025-05-07 DIAGNOSIS — Z20.828 MONO EXPOSURE: ICD-10-CM

## 2025-05-07 LAB
BASOPHILS # BLD AUTO: 0 10E3/UL (ref 0–0.2)
BASOPHILS NFR BLD AUTO: 0 %
DEPRECATED S PYO AG THROAT QL EIA: NEGATIVE
EOSINOPHIL # BLD AUTO: 0.2 10E3/UL (ref 0–0.7)
EOSINOPHIL NFR BLD AUTO: 2 %
ERYTHROCYTE [DISTWIDTH] IN BLOOD BY AUTOMATED COUNT: 11.8 % (ref 10–15)
HCT VFR BLD AUTO: 38.6 % (ref 35–47)
HGB BLD-MCNC: 13 G/DL (ref 11.7–15.7)
IMM GRANULOCYTES # BLD: 0 10E3/UL
IMM GRANULOCYTES NFR BLD: 0 %
LYMPHOCYTES # BLD AUTO: 2.4 10E3/UL (ref 0.8–5.3)
LYMPHOCYTES NFR BLD AUTO: 27 %
MCH RBC QN AUTO: 30 PG (ref 26.5–33)
MCHC RBC AUTO-ENTMCNC: 33.7 G/DL (ref 31.5–36.5)
MCV RBC AUTO: 89 FL (ref 78–100)
MONOCYTES # BLD AUTO: 0.7 10E3/UL (ref 0–1.3)
MONOCYTES NFR BLD AUTO: 8 %
MONOCYTES NFR BLD AUTO: NEGATIVE %
NEUTROPHILS # BLD AUTO: 5.6 10E3/UL (ref 1.6–8.3)
NEUTROPHILS NFR BLD AUTO: 63 %
PLATELET # BLD AUTO: 380 10E3/UL (ref 150–450)
RBC # BLD AUTO: 4.33 10E6/UL (ref 3.8–5.2)
S PYO DNA THROAT QL NAA+PROBE: NOT DETECTED
WBC # BLD AUTO: 8.9 10E3/UL (ref 4–11)

## 2025-05-07 PROCEDURE — 3079F DIAST BP 80-89 MM HG: CPT | Performed by: PHYSICIAN ASSISTANT

## 2025-05-07 PROCEDURE — 87651 STREP A DNA AMP PROBE: CPT | Performed by: PHYSICIAN ASSISTANT

## 2025-05-07 PROCEDURE — 87635 SARS-COV-2 COVID-19 AMP PRB: CPT | Performed by: PHYSICIAN ASSISTANT

## 2025-05-07 PROCEDURE — 86308 HETEROPHILE ANTIBODY SCREEN: CPT | Performed by: PHYSICIAN ASSISTANT

## 2025-05-07 PROCEDURE — 85025 COMPLETE CBC W/AUTO DIFF WBC: CPT | Performed by: PHYSICIAN ASSISTANT

## 2025-05-07 PROCEDURE — 36415 COLL VENOUS BLD VENIPUNCTURE: CPT | Performed by: PHYSICIAN ASSISTANT

## 2025-05-07 PROCEDURE — 99213 OFFICE O/P EST LOW 20 MIN: CPT | Performed by: PHYSICIAN ASSISTANT

## 2025-05-07 PROCEDURE — 3074F SYST BP LT 130 MM HG: CPT | Performed by: PHYSICIAN ASSISTANT

## 2025-05-07 NOTE — PROGRESS NOTES
Urgent Care Clinic Visit    Chief Complaint   Patient presents with    Urgent Care     Brother diagnosed with Mono. Feeling sick with flu like symptoms               5/7/2025     2:18 PM   Additional Questions   Roomed by MARGARET Rosales

## 2025-05-07 NOTE — PROGRESS NOTES
"Assessment & Plan     Throat pain    You had a strep test done today that was neg.  We will perform a strep culture and if any positive results come back we will call you and call in antibiotics.  At this time, this appears to be a viral infection and requires symptomatic treatment.  Most viral sore throats will resolve with in a few days.  If your throat pain worsens then please be  rechecked in the  or by your PCP.    Strep neg, culture pending  Strep neg  Mono neg  CBC normal  - COVID-19 Virus (Coronavirus) by PCR Nose  - Streptococcus A Rapid Screen w/Reflex to PCR  - Mononucleosis screen  - CBC with platelets and differential  - Mononucleosis screen  - CBC with platelets and differential  - Group A Streptococcus PCR Throat Swab    Other fatigue    Likely due to viral infection  Rest, fluids  Covid pending      Mono exposure    Patient has mono exposure but neg on exam and labs       No follow-ups on file.    Tomas Diaz, [facility], PA-C  [facility] [place]    Trent Paredes is a 20 year old female who presents to clinic today for the following health issues:  Chief Complaint   Patient presents with    Urgent Care     Brother diagnosed with Mono. Feeling sick with flu like symptoms         5/7/2025     2:18 PM   Additional Questions   Roomed by MARGARET Rosales     HPI  Review of Systems  Constitutional, HEENT, cardiovascular, pulmonary, gi and gu systems are negative, except as otherwise noted.      Objective    /80   Pulse 108   Temp 97.4  F (36.3  C) (Oral)   Resp 16   Ht 1.676 m (5' 6\")   Wt 81.6 kg (180 lb)   SpO2 98%   BMI 29.05 kg/m    Physical Exam   GENERAL: alert and no distress  EYES: Eyes grossly normal to inspection, PERRL and conjunctivae and sclerae normal  HENT: ear canals and TM's normal, nose and mouth without ulcers or lesions  NECK: no adenopathy, no asymmetry, masses, or scars  RESP: lungs clear to auscultation - no rales, rhonchi or wheezes  CV: regular " rate and rhythm, normal S1 S2, no S3 or S4, no murmur, click or rub, no peripheral edema  MS: no gross musculoskeletal defects noted, no edema  SKIN: no suspicious lesions or rashes  NEURO: Normal strength and tone, mentation intact and speech normal  PSYCH: mentation appears normal, affect normal/bright      Results for orders placed or performed in visit on 05/07/25   Mononucleosis screen     Status: Normal   Result Value Ref Range    Mononucleosis Screen Negative Negative   CBC with platelets and differential     Status: None   Result Value Ref Range    WBC Count 8.9 4.0 - 11.0 10e3/uL    RBC Count 4.33 3.80 - 5.20 10e6/uL    Hemoglobin 13.0 11.7 - 15.7 g/dL    Hematocrit 38.6 35.0 - 47.0 %    MCV 89 78 - 100 fL    MCH 30.0 26.5 - 33.0 pg    MCHC 33.7 31.5 - 36.5 g/dL    RDW 11.8 10.0 - 15.0 %    Platelet Count 380 150 - 450 10e3/uL    % Neutrophils 63 %    % Lymphocytes 27 %    % Monocytes 8 %    % Eosinophils 2 %    % Basophils 0 %    % Immature Granulocytes 0 %    Absolute Neutrophils 5.6 1.6 - 8.3 10e3/uL    Absolute Lymphocytes 2.4 0.8 - 5.3 10e3/uL    Absolute Monocytes 0.7 0.0 - 1.3 10e3/uL    Absolute Eosinophils 0.2 0.0 - 0.7 10e3/uL    Absolute Basophils 0.0 0.0 - 0.2 10e3/uL    Absolute Immature Granulocytes 0.0 <=0.4 10e3/uL   Streptococcus A Rapid Screen w/Reflex to PCR     Status: Normal    Specimen: Throat; Swab   Result Value Ref Range    Group A Strep antigen Negative Negative   CBC with platelets and differential     Status: None    Narrative    The following orders were created for panel order CBC with platelets and differential.  Procedure                               Abnormality         Status                     ---------                               -----------         ------                     CBC with platelets and ...[[id number]]                      Final result                 Please view results for these tests on the individual orders.

## 2025-05-08 LAB — SARS-COV-2 RNA RESP QL NAA+PROBE: NEGATIVE

## 2025-05-22 ENCOUNTER — VIRTUAL VISIT (OUTPATIENT)
Dept: PSYCHIATRY | Facility: CLINIC | Age: 20
End: 2025-05-22
Payer: COMMERCIAL

## 2025-05-22 DIAGNOSIS — F33.1 MODERATE EPISODE OF RECURRENT MAJOR DEPRESSIVE DISORDER (H): ICD-10-CM

## 2025-05-22 DIAGNOSIS — F41.1 GAD (GENERALIZED ANXIETY DISORDER): ICD-10-CM

## 2025-05-22 DIAGNOSIS — F90.0 ATTENTION DEFICIT HYPERACTIVITY DISORDER (ADHD), PREDOMINANTLY INATTENTIVE TYPE: ICD-10-CM

## 2025-05-22 RX ORDER — ATOMOXETINE 60 MG/1
60 CAPSULE ORAL DAILY
Qty: 90 CAPSULE | Refills: 0 | Status: SHIPPED | OUTPATIENT
Start: 2025-05-22

## 2025-05-22 RX ORDER — VILAZODONE HYDROCHLORIDE 40 MG/1
40 TABLET ORAL
Qty: 90 TABLET | Refills: 0 | Status: SHIPPED | OUTPATIENT
Start: 2025-05-22

## 2025-05-22 ASSESSMENT — PAIN SCALES - GENERAL: PAINLEVEL_OUTOF10: NO PAIN (0)

## 2025-05-22 NOTE — PROGRESS NOTES
[facility]  MEDICAL PROGRESS NOTE     Jenae Callaway is a 20 year old adult who prefers the name Lena and pronouns she/her.     CARE TEAM:   PCP- Jeanie West  Therapist- Lacey Gutierrez at [facility]              Assessment & Plan   History and interview support the following diagnoses:   Generalized anxiety disorder  Major depressive disorder, recurrent, moderate  ADHD, predominantly inattentive  Trichotillomania      Lena is a 20-year-old adult with psychiatric history of anxiety, depression, ADHD, and trichotillomania who presents for psychiatric follow-up.      Lena was last seen 04/10/25 at which time no changes were made. Today, Lena reports stable mood and good control of anxiety. She just finished her spring semester and is looking forward to the summer. She reports good benefit from her medications and denies side effects. She does report having had experienced a sensory disturbance in her brain several times; this usually occurs in the setting of medication non-adherence and sounds likely to be consistent with discontinuation symptoms. We talked about the importance of medication adherence and I asked Lena to track these symptoms over the next month so that we can determine if they are occurring when she misses her medications or takes them at variable times. Lena is agreeable with this plan.     Evaluation from 12/28/23 initial assessment:  Lena Callaway is a very pleasant 19yo adult who presents for a psychiatric evaluation at the [facility]. Patient carries previous diagnoses of MDD, NANNETTE, ADHD, and trichotillomania. She previously received care at [facility] and was most recently seen by Dr. Tisha Grant on 05/23/23 at which time sertraline 200mg daily and Concerta 36mg daily were continued. She transitioned out of [facility] due to turning 18 earlier this year. Since that visit Lena has struggled to establish with an  adult psychiatric provider. 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 She currently resides in [place] and is only home in [place] for a short period of time over the holidays. She plans to return to [place] in early January 2023 and will move back to the [place] in June 2024. Given that I am unable to prescribe medications or treat patient while she is overseas we decided to complete the evaluation today but will defer medications until patient returns from [place]. I recommended patient attempt to establish care with a psychiatric provider in [place] in the meantime. Both patient and her mother are in agreement with this plan and will contact clinic in June to schedule a follow-up when patient returns home. There are no acute safety concerns. No problematic substance use. Patient is not currently prescribed psychotropic medications.     Medication discussion:  Since stopping sertraline and Concerta patient has noticed minimal impact on mood or anxiety. 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 She has had several previous stimulant trials noting that ritalin IR seemed to be the most effective and best tolerated. Concerta and ritalin LA led to tachycardia and hyperfocus. We had a thorough discussion of non-stimulant options for management of ADHD - including bupropion, atomoxetine, or clonidine/guanfacine. The patient expressed interest in future trial of a non-stimulant medication for management of ADHD. Strattera may be a good option as it has been shown to be beneficial for ADHD as well as anxiety. We also reviewed benefits of antidepressant medication for management of mood/anxiety. She has past trials of escitalopram and sertraline. She felt that escitalopram led to more mood lability but sertraline seemed to be  beneficial and well-tolerated. Could consider use of sertraline again in the future or fluoxetine which could be a good option for mood/anxiety and trichotillomania. We will plan to continue our medication discussion on follow-up in June.     PSYCHOTROPIC DRUG INTERACTIONS: **Italicized interactions are for treatment plan options not currently implemented**  N/A    MANAGEMENT:  N/A    [place] was not [place] today: Not taking controlled medications.              Plan    1) PSYCHOTROPIC MEDICATION RECOMMENDATIONS:  -Continue atomoxetine 60mg daily for ADHD  -Continue Viibryd to 40mg daily   -Continue hydroxyzine 10-20mg once daily as needed for anxiety or sleep    Taking OTC supplement Berberine. DDI completed - nothing clinically significant noted with Strattera + Viibryd.    2) THERAPY: None    3) NEXT DUE:   Labs- None - LFTs completed 07/10/24 prior to initiation of atomoxetine (WNL)  ECG- N/A   Rating Scales- N/A    4) REFERRALS / COORDINATION: Consider PGx testing    5) RTC: 06/20 at 12:30p    Treatment Risk Statement:  The patient understands the risks, benefits, adverse effects and alternatives. Agrees to treatment with the capacity to do so. No medical contraindications to treatment. Agrees to contact provider for any problems. The patient understands to call 911 or go to the nearest ED if urgent or life threatening symptoms occur. Crisis contact numbers are provided routinely in the After Visit Summary.     Lena did not appear to be an imminent safety risk to self or others.    PROVIDER:  YANICK Arora CNP              Pertinent Background  Jenae notes history of anxiety, depression, ADHD, and trichotillomania. She was followed previously by a child fellow (Dr. Grant) at [facility] however transitioned out of that clinic in May 2023 due to turning 18-years-old. Since then Lena has struggled to establish consistent care with an adult psychiatric provider. She had one previous appointment with a provider  "through [facility] who has since left the organization. Stopped sertraline and Concerta summer 2023. Not currently taking psychotropic medication.   No history of self-harm, suicide attempts, hypo/leonard, psychosis, or psychiatric hospitalizations. Hx of passive SI lauro and senior year of high school; never with plan or intent. She completed PHP in spring 2022.   Psych pertinent item history includes suicidal ideation              Interim History     Lena was last seen 04/10/25 at which time no changes were made. Since the last visit:  -Spring semester went well; she is satisfied with her grades.  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   -Things at home have been \"pretty okay.\" Occasional tension with parents.   -She's applying to transfer to the [employer] for fall semester; would likely live on campus.  -Mood has been stable; anxiety is well controlled.    Medication ASE: Endorses experiencing a \"wave of discomfort\" in her brain when she forgets to take her medications; this usually happens in the evening. No loss of consciousness or pain.   Medication adherence: Reports good adherence    Recent Psych Symptoms:   Depression: Motivation and mood are more consistent.   Elevated:  none  Psychosis:  none  Anxiety:  worry usually pertains to the future and feeling like she isn't doing enough.  Trauma Related:  none  Insomnia: Does have trouble getting herself to actually getting to bed. Some nights can take ~20 min to fall asleep.  Other:  Yes: hx of trichotillomania, improving. Occasionally will hair pull when she is stressed or tired.     Pertinent Negatives: No suicidal or violent ideation, psychosis, or hypo/leonard.      Pertinent Social Hx:  FINANCIAL SUPPORT-  Working as a ; about 20 hours per week. Taking courses at [employer].  LIVING SITUATION / RELATIONSHIPS- Living with parents, younger " brother.  SOCIAL/ SPIRITUAL SUPPORT- Family, friends    Pertinent Substance Use  Alcohol- None  Nicotine- None  Caffeine- None   Opioids- None  Narcan Kit- N/A  THC/CBD- None  Other Illicit Drugs- none              Medical Review of Systems   Dizziness/orthostasis- Denies  Headaches- Denies  GI- Denies  Denies history of renal, hepatic, or cardiac concerns. Was seen by cardiology in July 2023 due to history of dyslipidemia. Working on lifestyle strategies for lowering cholesterol.   A comprehensive review of systems was performed and is negative other than noted above.                                                                                                                                       Psych Summary Points  12/2023: Initial consultation; no medication changes as patient will be returning to live in [place] through June 2024.  07/2024: Started Strattera 40mg daily for ADHD symptoms, added hydroxyzine 10-20mg every day PRN for breakthrough anxiety symptoms. LFTs completed 07/10/24 prior to initiation of Strattera - LFTs = WNL.  08/2024: Started Zoloft 50mg daily    10/2024: Self-discontinued Zoloft due to ASE, Viibryd 10mg daily started for depressive symptoms.  11/2024: Increased Strattera to 60mg daily for ADHD management. Increased Viibryd to 20mg daily.  02/2025: Increased Viibryd 40mg daily for mood support               Past Psychotropic Medications     Medication Max Dose (mg) Dates / Duration Helpful? DC Reason / Adverse Effects?   Ritalin IR 5mg BID PRN   Worked the best out of all stimulants. Helped with focus, thinking, organization of thoughts. Pretty well tolerated. Took with Ritalin LA 20mg every day.   sertraline 200mg Spring 2022-Fall 2023  Helpful for mood, but no noticeable difference when stopped. Denies ASE from med. Re-trial fall 2024 - led to insomnia, heightened anxiety and was subsequently discontinued.    Ritalin LA 20mg   Tachycardia    Concerta 36mg 2023  Physically felt  anxious, tachycardia, hyperfocus   Lexapro 20mg [id number]  Mood lability, took prior to sertraline   Propranolol 10mg every day PRN 2022                   Past Medical History     Medication allergies:    Allergies   Allergen Reactions    Animal Dander Dermatitis and Itching     Neurologic Hx [head injury, seizures, etc.]: None  Patient Active Problem List   Diagnosis    Generalized anxiety disorder    Trichotillomania    NANNETTE (generalized anxiety disorder)    Menorrhagia with regular cycle    Attention deficit hyperactivity disorder (ADHD), predominantly inattentive type    Mixed hyperlipidemia    IUD (intrauterine device) in place     Past Medical History:   Diagnosis Date    Acute appendicitis with localized peritonitis, unspecified whether abscess present, unspecified whether gangrene present, unspecified whether perforation present 06/08/2023       Contraception- IUD in place, not sexually active               Medications   Current Outpatient Medications   Medication Sig Dispense Refill    atomoxetine (STRATTERA) 60 MG capsule Take 1 capsule (60 mg) by mouth daily. 90 capsule 1    hydrOXYzine HCl (ATARAX) 10 MG tablet Take 1-2 tablets (10-20 mg) by mouth daily as needed for anxiety. 30 tablet 0    levonorgestrel (MIRENA) 52 MG (20 mcg/day) IUD by Intrauterine route once      vilazodone (VIIBRYD) 40 MG TABS tablet Take 1 tablet (40 mg) by mouth daily with food. 90 tablet 0                 Physical Exam  (Vitals Only)  There were no vitals taken for this visit.    Pulse Readings from Last 5 Encounters:   05/07/25 108   03/20/25 78   01/23/25 90   11/25/24 77   08/19/24 105     Wt Readings from Last 5 Encounters:   05/07/25 81.6 kg (180 lb)   03/20/25 81.6 kg (180 lb)   01/23/25 83.9 kg (185 lb)   11/25/24 81.6 kg (180 lb) (94%, Z= 1.59)*   08/19/24 81.9 kg (180 lb 9.6 oz) (95%, Z= 1.61)*     * Growth percentiles are based on CDC (Girls, 2-20 Years) data.     BP Readings from Last 5 Encounters:   05/07/25 118/80    03/20/25 110/68   01/23/25 104/74   11/25/24 110/62   08/19/24 113/80                 Mental Status Exam  Alertness: alert  and oriented  Appearance: adequately groomed  Behavior/Demeanor: cooperative, pleasant, and calm, with good eye contact   Speech: normal and regular rate and rhythm  Language: intact and no problems  Psychomotor: normal or unremarkable  Mood: description consistent with euthymia  Affect: full range and appropriate; was congruent to mood  Thought Process/Associations: unremarkable  Thought Content:  Reports none;  Denies suicidal ideation, violent ideation, and delusions  Perception:  Reports none;  Denies auditory hallucinations and visual hallucinations  Insight: intact  Judgment: intact  Cognition: does  appear grossly intact; formal cognitive testing was not done  oriented: time, person, and place  Gait and Station: N/A ([facility])                Data         8/19/2024    12:18 PM 10/10/2024     1:38 PM 1/23/2025     3:58 PM   PHQ   PHQ-9 Total Score 6 4 7    Q9: Thoughts of better off dead/self-harm past 2 weeks Not at all Not at all Not at all       Patient-reported         10/10/2024     1:39 PM 12/23/2024    12:05 PM 1/23/2025     3:59 PM   NANNETTE-7 SCORE   Total Score 3 (minimal anxiety) 10 (moderate anxiety) 5 (mild anxiety)   Total Score 3 10  5        Patient-reported       Liver/kidney function Metabolic Blood counts   Recent Labs   Lab Test 07/10/24  1043 06/07/23  2240   CR 0.67 0.69   AST 23 22   ALT 20 14   ALKPHOS 102 102*    Recent Labs   Lab Test 01/31/25  0913 07/10/24  1043 01/09/24  1124   CHOL 222*   < > 298*   TRIG 162*   < > 119*   *   < > 221*   HDL 42*   < > 53   TSH  --   --  3.23    < > = values in this interval not displayed.    Recent Labs   Lab Test 05/07/25  1436   WBC 8.9   HGB 13.0   HCT 38.6   MCV 89             Administrative Billing:     Level of Medical Decision Making:   - At least 1 chronic problem that is not stable  - Engaged in  prescription drug management during visit (discussed any medication benefits, side effects, alternatives, etc.)    The longitudinal plan of care for the diagnosis(es)/condition(s) as documented were addressed during this visit. Due to the added complexity in care, I will continue to support Lena in the subsequent management and with ongoing continuity of care.

## 2025-05-22 NOTE — NURSING NOTE
Current patient location: [address] [place] [address]    Is the patient currently in the state of [place]? YES    Visit mode: VIDEO    If the visit is dropped, the patient can be reconnected by:VIDEO VISIT: Send to e-mail at: [email or web address]    Will anyone else be joining the visit? NO  (If patient encounters technical issues they should call [phone]    Are changes needed to the allergy or medication list? No    Are refills needed on medications prescribed by this physician? Discuss with provider    Rooming Documentation:  Questionnaire(s) completed    Reason for visit: RECHECK    Sorin BONILLA

## 2025-05-22 NOTE — PATIENT INSTRUCTIONS
Plan:  -Continue atomoxetine 60mg daily for ADHD  -Continue Viibryd to 40mg daily   -Continue hydroxyzine 10-20mg once daily as needed for anxiety or sleep    **For crisis resources, please see the information at the end of this document**   Patient Education    Thank you for coming to the [facility] [place] [facility].     Lab Testing:  If you had lab testing today and your results are reassuring or normal they will be mailed to you or sent through [employer] within 7 days. If the lab tests need quick action we will call you with the results. The phone number we will call with results is # [phone]. If this is not the best number please call our clinic and change the number.     Medication Refills:  If you need any refills please call your pharmacy and they will contact us. Our fax number for refills is [phone].   Three business days of notice are needed for general medication refill requests.   Five business days of notice are needed for controlled substance refill requests.   If you need to change to a different pharmacy, please contact the new pharmacy directly. The new pharmacy will help you get your medications transferred.     Contact Us:  Please call [phone] during business hours (8-5:00 M-F).   If you have medication related questions after clinic hours, or on the weekend, please call [phone].     Financial Assistance [phone]   Medical Records [phone]       MENTAL HEALTH CRISIS RESOURCES:  For a emergency help, please call 911 or go to the nearest Emergency Department.     Emergency Walk-In Options:   EmPATH Unit @ [facility] ([place]): [phone] - Specialized mental health emergency area designed to be calming  [facility] ([place]): [phone]  [facility] ([place]): [phone]  [facility] ([place]): [phone]    [facility] Crisis Information:   [place]: [phone]  [place]:  [phone]  [place] ([place]) - Adult: [phone]     Child: [phone]  [place] - Adult: [phone]     Child: [phone]  [place]: [phone]  List of all [facility] resources:   https://[place].gov/dhs/people-we-serve/adults/health-care/mental-health/resources/crisis-contacts.jsp    National Crisis Information:   Crisis Text Line: Text  [place]  to 707702  Suicide & Crisis Lifeline: 988  National Suicide Prevention Lifeline: 2-419-166-TALK (1-679.122.6169)       For online chat options, visit https://suicidepreventionlifeline.org/chat/  Poison Control Center: 1-121.479.1155  Trans Lifeline: 1-664.948.1665 - Hotline for transgender people of all ages  [employer]: 7-493-438-0694 - Hotline for LGBT youth     For Non-Emergency Support:   Fast Tracker: Mental Health & Substance Use Disorder Resources -   https://www.[employer].org/

## 2025-05-22 NOTE — PROGRESS NOTES
Virtual Visit Details    Type of service:  Video Visit     Originating Location (pt. Location): Friend's home    Distant Location (provider location):  Off-site  Platform used for Video Visit: Kristina

## 2025-06-12 ENCOUNTER — RESULTS FOLLOW-UP (OUTPATIENT)
Dept: URGENT CARE | Facility: URGENT CARE | Age: 20
End: 2025-06-12

## 2025-06-12 ENCOUNTER — OFFICE VISIT (OUTPATIENT)
Dept: URGENT CARE | Facility: URGENT CARE | Age: 20
End: 2025-06-12
Payer: COMMERCIAL

## 2025-06-12 VITALS
DIASTOLIC BLOOD PRESSURE: 81 MMHG | HEIGHT: 66 IN | HEART RATE: 98 BPM | SYSTOLIC BLOOD PRESSURE: 125 MMHG | WEIGHT: 185 LBS | RESPIRATION RATE: 14 BRPM | BODY MASS INDEX: 29.73 KG/M2 | TEMPERATURE: 98.6 F | OXYGEN SATURATION: 97 %

## 2025-06-12 DIAGNOSIS — R10.814 LEFT LOWER QUADRANT ABDOMINAL TENDERNESS WITHOUT REBOUND TENDERNESS: Primary | ICD-10-CM

## 2025-06-12 LAB
ALBUMIN UR-MCNC: NEGATIVE MG/DL
APPEARANCE UR: CLEAR
BASOPHILS # BLD AUTO: 0 10E3/UL (ref 0–0.2)
BASOPHILS NFR BLD AUTO: 0 %
BILIRUB UR QL STRIP: NEGATIVE
COLOR UR AUTO: YELLOW
EOSINOPHIL # BLD AUTO: 0.1 10E3/UL (ref 0–0.7)
EOSINOPHIL NFR BLD AUTO: 1 %
ERYTHROCYTE [DISTWIDTH] IN BLOOD BY AUTOMATED COUNT: 11.8 % (ref 10–15)
GLUCOSE UR STRIP-MCNC: NEGATIVE MG/DL
HCT VFR BLD AUTO: 36.1 % (ref 35–47)
HGB BLD-MCNC: 12 G/DL (ref 11.7–15.7)
HGB UR QL STRIP: NEGATIVE
IMM GRANULOCYTES # BLD: 0 10E3/UL
IMM GRANULOCYTES NFR BLD: 0 %
KETONES UR STRIP-MCNC: NEGATIVE MG/DL
LEUKOCYTE ESTERASE UR QL STRIP: NEGATIVE
LYMPHOCYTES # BLD AUTO: 3.7 10E3/UL (ref 0.8–5.3)
LYMPHOCYTES NFR BLD AUTO: 44 %
MCH RBC QN AUTO: 29.9 PG (ref 26.5–33)
MCHC RBC AUTO-ENTMCNC: 33.2 G/DL (ref 31.5–36.5)
MCV RBC AUTO: 90 FL (ref 78–100)
MONOCYTES # BLD AUTO: 0.7 10E3/UL (ref 0–1.3)
MONOCYTES NFR BLD AUTO: 8 %
NEUTROPHILS # BLD AUTO: 4 10E3/UL (ref 1.6–8.3)
NEUTROPHILS NFR BLD AUTO: 47 %
NITRATE UR QL: NEGATIVE
PH UR STRIP: 7 [PH] (ref 5–7)
PLATELET # BLD AUTO: 364 10E3/UL (ref 150–450)
RBC # BLD AUTO: 4.01 10E6/UL (ref 3.8–5.2)
RBC #/AREA URNS AUTO: NORMAL /HPF
SP GR UR STRIP: 1.02 (ref 1–1.03)
UROBILINOGEN UR STRIP-ACNC: 0.2 E.U./DL
WBC # BLD AUTO: 8.6 10E3/UL (ref 4–11)
WBC #/AREA URNS AUTO: NORMAL /HPF

## 2025-06-12 NOTE — PROGRESS NOTES
Chief Complaint   Patient presents with    Abdominal Pain     Left lower quadrant pain x 2 days, feels achy, sometimes sharp pain        ASSESSMENT/PLAN:  Lena was seen today for abdominal pain.    Diagnoses and all orders for this visit:    Left lower quadrant abdominal tenderness without rebound tenderness  -     Referral to Acute and Diagnostic Services (Day of diagnostic / First order acute); Future  -     CBC with platelets and differential; Future  -     Cancel: UA with Microscopic reflex to Culture - Clinic Collect  -     CBC with platelets and differential  -     UA with Microscopic reflex to Culture; Future  -     UA with Microscopic reflex to Culture  -     UA Microscopic with Reflex to Culture    CBC normal, UA negative.  Patient's status post appendectomy.  Still considered torsion, ovarian cyst, tubo-ovarian abscess among others.  Currently she is vitally stable and nonacute abdomen I think it is reasonable for her to wait till tomorrow and be seen in the [facility] for further evaluation and management.  However if she has any new or worsening symptoms overnight go to the ER    Grant Van PA-C      SUBJECTIVE:  Jenae is a 20 year old female who presents to urgent care with LLQ pain x 2 days and worsening. 4/10 constantly, increases too sharp pain with certain movments nad breathing deeply.  had a stool today that was brown and solid and not dark or bloody she denies.  Chance of pregnancy she has an IUD she has not recently felt the strings but had an ultrasound few months ago due to having heavy periods she is not currently on her period right now she has no fever.  There is no nausea and vomiting she does endorse fatigue today and a lower appetite she denies urinary symptoms no vaginal discharge no possibility of STDs she does not have any history of IBS bowel problems or colitis says she is possibly had ovarian cysts found on an ultrasound but she was unsure  History of appendectomy  Her period is  "irregular and is not sure when the last one was  2/6/2025 she had a ultrasound of her pelvis which did not show any significant finding    ROS: Pertinent ROS neg other than the symptoms noted above in the HPI.     OBJECTIVE:  /81   Pulse 98   Temp 98.6  F (37  C) (Temporal)   Resp 14   Ht 1.676 m (5' 6\")   Wt 83.9 kg (185 lb)   SpO2 97%   BMI 29.86 kg/m     GENERAL: alert and no distress  EYES: Eyes grossly normal to inspection, PERRL and conjunctivae and sclerae normal  HENT: ear canals and TM's normal, nose and mouth without ulcers or lesions  RESP: lungs clear to auscultation - no rales, rhonchi or wheezes  CV: regular rate and rhythm, normal S1 S2, no S3 or S4, no murmur, click or rub, no peripheral edema   ABDOMEN: soft focal tenderness of the left lower quadrant  MS: no gross musculoskeletal defects noted, no edema  SKIN: no suspicious lesions or rashes  NEURO: Normal strength and tone, mentation intact and speech normal  BACK: no CVA tenderness, no paralumbar tenderness  LYMPH: no cervical, supraclavicular, axillary, or inguinal adenopathy    DIAGNOSTICS          Current Outpatient Medications   Medication Sig Dispense Refill    atomoxetine (STRATTERA) 60 MG capsule Take 1 capsule (60 mg) by mouth daily. 90 capsule 0    hydrOXYzine HCl (ATARAX) 10 MG tablet Take 1-2 tablets (10-20 mg) by mouth daily as needed for anxiety. 30 tablet 0    levonorgestrel (MIRENA) 52 MG (20 mcg/day) IUD by Intrauterine route once      vilazodone (VIIBRYD) 40 MG TABS tablet Take 1 tablet (40 mg) by mouth daily with food. 90 tablet 0     No current facility-administered medications for this visit.      Patient Active Problem List   Diagnosis    Generalized anxiety disorder    Trichotillomania    NANNETTE (generalized anxiety disorder)    Menorrhagia with regular cycle    Attention deficit hyperactivity disorder (ADHD), predominantly inattentive type    Mixed hyperlipidemia    IUD (intrauterine device) in place      Past " Medical History:   Diagnosis Date    Acute appendicitis with localized peritonitis, unspecified whether abscess present, unspecified whether gangrene present, unspecified whether perforation present 06/08/2023     Past Surgical History:   Procedure Laterality Date    LAPAROSCOPIC APPENDECTOMY N/A 6/8/2023    Procedure: Laparoscopic appendectomy;  Surgeon: Jarod South MD;  Location:  OR     Family History   Problem Relation Age of Onset    Substance Abuse Maternal Grandfather     Substance Abuse Maternal Aunt     Glaucoma No family hx of     Hypertension No family hx of     Diabetes No family hx of     Macular Degeneration No family hx of     Breast Cancer No family hx of     Colon Cancer No family hx of     Ovarian Cancer No family hx of      Social History     Tobacco Use    Smoking status: Never     Passive exposure: Never    Smokeless tobacco: Never   Substance Use Topics    Alcohol use: Not Currently              The plan of care was discussed with the patient. They understand and agree with the course of treatment prescribed. A printed summary was given including instructions and medications.  The use of Dragon/[employer] dictation services may have been used to construct the content in this note; any grammatical or spelling errors are non-intentional. Please contact the author of this note directly if you are in need of any clarification.

## 2025-06-12 NOTE — PATIENT INSTRUCTIONS
Process for making after hours referrals to the ADS    1.  Send an Epic message to the [facility]S clinic pool at patient preferred site  [place]: P [place] [facility] [facility]  ([id number])  [place]: [facility] [place] [facility]([id number])  [place]: [facility] [place] [facility] ([id number])    [place]: [facility] [place] [facility] ([id number])  [place]: [facility] [place] [facility] ([id number])  Include the reason for the referral and any pertinent information.    2.  Verify that the patient is medically stable and willing to be seen shortly after 9 am the next day the clinic is open.  If the patient is having abdominal or GI symptoms, consider telling them to be NPO if you think imaging will be needed.    3.  Give the patient the phone number for the site.  Have the patient call the ADS if they haven't been contacted by 10 am.  [place]: [phone]  [place]:  [phone]  [place]: [phone]   [place]:  [phone]    [phone]

## 2025-06-12 NOTE — PROGRESS NOTES
Urgent Care Clinic Visit    Chief Complaint   Patient presents with    Abdominal Pain     Left lower quadrant pain x 2 days, feels achy, sometimes sharp pain                6/12/2025     4:22 PM   Additional Questions   Roomed by Ksaia   Accompanied by nichole

## 2025-06-13 ENCOUNTER — ANCILLARY PROCEDURE (OUTPATIENT)
Dept: ULTRASOUND IMAGING | Facility: CLINIC | Age: 20
End: 2025-06-13
Attending: INTERNAL MEDICINE
Payer: COMMERCIAL

## 2025-06-13 DIAGNOSIS — R10.32 LLQ ABDOMINAL PAIN: ICD-10-CM

## 2025-06-13 DIAGNOSIS — R10.2 PELVIC PAIN IN FEMALE: ICD-10-CM

## 2025-06-13 PROCEDURE — 76830 TRANSVAGINAL US NON-OB: CPT

## 2025-07-09 DIAGNOSIS — F41.1 GAD (GENERALIZED ANXIETY DISORDER): ICD-10-CM

## 2025-07-09 RX ORDER — HYDROXYZINE HYDROCHLORIDE 10 MG/1
10-20 TABLET, FILM COATED ORAL DAILY PRN
Qty: 180 TABLET | Refills: 0 | Status: SHIPPED | OUTPATIENT
Start: 2025-07-09

## 2025-07-09 NOTE — TELEPHONE ENCOUNTER
A 90-day supply refill request r'cd from [facility] via fax for Hydroxyzine 10 mg      This medication was last prescribed on 6/20/25 for Qty of 30 with 0 refill(s).     hydrOXYzine HCl (ATARAX) 10 MG tablet 30 tablet 0 6/20/2025 -- No   Sig - Route: Take 1-2 tablets (10-20 mg) by mouth daily as needed for anxiety. - Oral   Sent to pharmacy as: hydrOXYzine HCl 10 MG Oral Tablet (ATARAX)   Class: E-Prescribe       Date of Last Office Visit: 6/20/2025  Date of Next Office Visit: 8/1/2025    Please review and sign if appropriate.

## (undated) DEVICE — DEVICE SUTURE PASSER 14GA WECK EFX EFXSP2

## (undated) DEVICE — SU VICRYL 0 TIE 54" J608H

## (undated) DEVICE — TUBING SMOKE EVAC PNEUMOCLEAR HIGH FLOW 0620050250

## (undated) DEVICE — LINEN TOWEL PACK X5 5464

## (undated) DEVICE — LINEN TOWEL PACK X6 WHITE 5487

## (undated) DEVICE — ENDO TROCAR FIRST ENTRY KII FIOS Z-THRD 05X100MM CTF03

## (undated) DEVICE — LIGHT HANDLE X1 31140133

## (undated) DEVICE — ENDO TROCAR SLEEVE KII Z-THREADED 05X100MM CTS02

## (undated) DEVICE — ESU PENCIL W/COATED BLADE E2450H

## (undated) DEVICE — NDL INSUFFLATION 13GA 120MM C2201

## (undated) DEVICE — SU MONOCRYL 4-0 PS-2 27" UND Y426H

## (undated) DEVICE — SU DERMABOND ADVANCED .7ML DNX12

## (undated) DEVICE — SOL WATER IRRIG 1000ML BOTTLE 2F7114

## (undated) DEVICE — ANTIFOG SOLUTION W/FOAM PAD 31142527

## (undated) DEVICE — COVER CAMERA IN-LIGHT DISP LT-C02

## (undated) DEVICE — ENDO POUCH UNIVERSAL RETRIEVAL SYSTEM INZII 5MM CD003

## (undated) DEVICE — SU VICRYL 0 UR-6 27" J603H

## (undated) DEVICE — ESU LIGASURE LAPAROSCOPIC BLUNT TIP SEALER 5MMX37CM LF1837

## (undated) DEVICE — ESU GROUND PAD ADULT W/CORD E7507

## (undated) DEVICE — SU ENDO POLYSORB 0 3" LOOP 21" EL-21-L

## (undated) DEVICE — Device

## (undated) DEVICE — PREP CHLORAPREP 26ML TINTED HI-LITE ORANGE 930815

## (undated) RX ORDER — ACETAMINOPHEN 325 MG/1
TABLET ORAL
Status: DISPENSED
Start: 2023-06-08

## (undated) RX ORDER — ENOXAPARIN SODIUM 100 MG/ML
INJECTION SUBCUTANEOUS
Status: DISPENSED
Start: 2023-06-08

## (undated) RX ORDER — ONDANSETRON 2 MG/ML
INJECTION INTRAMUSCULAR; INTRAVENOUS
Status: DISPENSED
Start: 2023-06-08

## (undated) RX ORDER — BUPIVACAINE HYDROCHLORIDE AND EPINEPHRINE 2.5; 5 MG/ML; UG/ML
INJECTION, SOLUTION EPIDURAL; INFILTRATION; INTRACAUDAL; PERINEURAL
Status: DISPENSED
Start: 2023-06-08

## (undated) RX ORDER — FENTANYL CITRATE 50 UG/ML
INJECTION, SOLUTION INTRAMUSCULAR; INTRAVENOUS
Status: DISPENSED
Start: 2023-06-08

## (undated) RX ORDER — PROPOFOL 10 MG/ML
INJECTION, EMULSION INTRAVENOUS
Status: DISPENSED
Start: 2023-06-08